# Patient Record
(demographics unavailable — no encounter records)

---
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
## 2025-05-27 NOTE — PHYSICAL EXAM
[Normal Appearance] : normal in appearance [No Masses] : no palpable masses [No Nipple Discharge] : no nipple discharge [No Axillary Lymphadenopathy] : no axillary lymphadenopathy [Normal] : normal gait, coordination grossly intact, no focal deficits and deep tendon reflexes were 2+ and symmetric [de-identified] : Blood pressure on standing 108/68 [de-identified] : [place]-Hallpike maneuver did not trigger symptoms Alert and oriented to person, place and time
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
## 2025-05-27 NOTE — ASSESSMENT
[FreeTextEntry1] : Blood work reviewed A1c elevated did advise to repeat in July Declined mammogram Advised to stay hydrated eat small frequent meals, did advised to do advanced imaging given the dizziness a however patient preferred to obtain review of records, did advise to follow-up in July to repeat A1c levels. Previous blood work did not show anemia or hyponatremia. At times gets a flank pain, ultrasound ordered up-to-date with colonoscopy denies any nausea vomiting constipation. Declined imaging for flank pain, will obtain records from other provider.

## 2025-05-27 NOTE — PHYSICAL EXAM
[Normal Appearance] : normal in appearance [No Masses] : no palpable masses [No Nipple Discharge] : no nipple discharge [No Axillary Lymphadenopathy] : no axillary lymphadenopathy [Normal] : normal gait, coordination grossly intact, no focal deficits and deep tendon reflexes were 2+ and symmetric [de-identified] : Blood pressure on standing 108/68 [de-identified] : [place]-Hallpike maneuver did not trigger symptoms